# Patient Record
Sex: MALE | Race: ASIAN | NOT HISPANIC OR LATINO | URBAN - METROPOLITAN AREA
[De-identification: names, ages, dates, MRNs, and addresses within clinical notes are randomized per-mention and may not be internally consistent; named-entity substitution may affect disease eponyms.]

---

## 2018-02-02 NOTE — PROGRESS NOTES
SUBJECTIVE:   CC: Israel Urrutia is an 37 year old male who presents for preventative health visit. His last physical was in 2015.  He has the following concerns:    Has not been taking care of himself.  Two small kids and very busy life.  Lives in the [facility] with family.  Travels internationally frequently. Has gained weight over the past couple years.  Wt Readings from Last 4 Encounters:   02/08/18 196 lb 8 oz (89.1 kg)   05/19/16 196 lb (88.9 kg)   03/12/15 186 lb (84.4 kg)   11/07/14 184 lb (83.5 kg)     BP Readings from Last 6 Encounters:   02/08/18 (!) 141/92   05/19/16 121/83   03/12/15 (!) 128/91   11/07/14 124/82   03/14/14 125/84   10/14/13 120/81       Healthy Habits:    Do you get at least three servings of calcium containing foods daily (dairy, green leafy vegetables, etc.)?  no, but taking  vitamin D supplement    Amount of exercise or daily activities, outside of work: 1-2 days per week of running.    Problems taking medications regularly Yes most of the time forgets to take them.    Medication side effects: No    Have you had an eye exam in the past two years? yes    Do you see a dentist twice per year? yes    Do you have sleep apnea, excessive snoring or daytime drowsiness?no       Today's PHQ-2 Score:   PHQ-2 ( 1999 Pf[employer]) 2/8/2018 5/19/2016   Q1: Little interest or pleasure in doing things 0 0   Q2: Feeling down, depressed or hopeless 0 0   PHQ-2 Score 0 0     Patient Active Problem List    Diagnosis Date Noted     Lichen planus      Priority: Medium     arms/chest         Past Medical History:   Diagnosis Date     Lichen planus     arms/chest       Past Surgical History:   Procedure Laterality Date     NO HISTORY OF SURGERY         Family History   Problem Relation Age of Onset     Thyroid Disease Mother      DIABETES Mother      Hypertension Father        Social History   Substance Use Topics     Smoking status: Never Smoker     Smokeless tobacco: Never Used     Alcohol use 0.6  oz/week     1 Standard drinks or equivalent per week       Social History     Social History Narrative    , 1 son.  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  Travels frequently.         [place] lives wife and 2 young children.  Work is stressful and he travels internationally frequently.    Has a good support system.    Feels safe in all environments.    Wears seatbelt 100% of the time    Wears helmet while biking.    Wears glasses.  Regular yearly visits.    Denies history of abuse, past or present, physical, sexual or emotional.    02/08/18    Pily Cunningham PA-C           Current Outpatient Prescriptions   Medication Sig Dispense Refill     Cholecalciferol (VITAMIN D) 1000 UNITS capsule Take 1 capsule by mouth daily       fish oil-omega-3 fatty acids (FISH OIL) 1000 MG capsule Take 2 g by mouth daily         Reviewed orders with patient. Reviewed health maintenance and updated orders accordingly - Yes    Reviewed and updated as needed this visit by clinical staff  Tobacco  Allergies  Meds  Problems  Med Hx  Surg Hx  Fam Hx  Soc Hx          Reviewed and updated as needed this visit by Provider  Tobacco  Allergies  Meds  Problems  Med Hx  Surg Hx  Fam Hx  Soc Hx           ROS:  C: NEGATIVE for fever, chills, change in weight  I: NEGATIVE for worrisome rashes, moles or lesions  E: NEGATIVE for vision changes or irritation  ENT: NEGATIVE for ear, mouth and throat problems  R: NEGATIVE for significant cough or SOB  CV: NEGATIVE for chest pain, palpitations or peripheral edema  GI: NEGATIVE for nausea, abdominal pain, heartburn, or change in bowel habits   male: negative for dysuria, hematuria, decreased urinary stream, erectile dysfunction, urethral discharge  M: NEGATIVE for significant arthralgias or myalgia  N: NEGATIVE for weakness, dizziness or paresthesias  E: NEGATIVE for temperature intolerance, skin/hair changes  H: NEGATIVE for bleeding problems  P: NEGATIVE for changes in mood or  "affect    OBJECTIVE:   BP (!) 141/92 (BP Location: Right arm, Patient Position: Chair, Cuff Size: Adult Regular)  Pulse 70  Temp 98.5  F (36.9  C) (Temporal)  Ht 5' 8.5\" (174 cm)  Wt 196 lb 8 oz (89.1 kg)  SpO2 100%  BMI 29.44 kg/m2  EXAM:  GENERAL: healthy, alert and no distress  EYES: Eyes grossly normal to inspection, PERRL and conjunctivae and sclerae normal  HENT: ear canals and TM's normal, nose and mouth without ulcers or lesions  NECK: no adenopathy, no asymmetry, masses, or scars and thyroid normal to palpation. No bruits  RESP: lungs clear to auscultation - no rales, rhonchi or wheezes  CV: regular rate and rhythm, normal S1 S2, no S3 or S4, no murmur, click or rub, no peripheral edema and peripheral pulses strong  ABDOMEN: soft, nontender, no hepatosplenomegaly, no masses and bowel sounds normal  MS: no gross musculoskeletal defects noted, no clubbing, edema or cyanosis of extremities.  Pulses = and appropriate bilaterally to DP and PT  SKIN: no suspicious lesions or rashes  NEURO: Normal strength and tone, mentation intact and speech normal  PSYCH: mentation appears normal, affect normal/bright  LYMPH: no cervical, supraclavicular, axillary, or inguinal adenopathy    ASSESSMENT/PLAN:       ICD-10-CM    1. Routine general medical examination at a health care facility Z00.00 Glucose Casual ([place])   2. Elevated blood pressure reading without diagnosis of hypertension R03.0    3. Screening for lipid disorders Z13.220 Lipid Panel ([facility])       COUNSELING:  Reviewed preventive health counseling, as reflected in patient instructions  Special attention given to:        Regular exercise       Healthy diet/nutrition       Vision screening       Immunizations    Recommended flu vac/declined       Osteoporosis Prevention/Bone Health    BP Screening:   Last 3 BP Readings:    BP Readings from Last 3 Encounters:   02/08/18 (!) 141/92   05/19/16 121/83   03/12/15 (!) 128/91       The following was " "recommended to the patient:  Re-screen within 4 weeks and recommend lifestyle modifications:  Recommended DASH diet.   reports that he has never smoked. He has never used smokeless tobacco.    Estimated body mass index is 29.44 kg/(m^2) as calculated from the following:    Height as of this encounter: 5' 8.5\" (174 cm).    Weight as of this encounter: 196 lb 8 oz (89.1 kg).   Weight management plan: Discussed healthy diet and exercise guidelines and patient will follow up in 12 months in clinic to re-evaluate.    Counseling Resources:  ATP IV Guidelines  Pooled Cohorts Equation Calculator  FRAX Risk Assessment  ICSI Preventive Guidelines  Dietary Guidelines for Americans, 2010  USDA's MyPlate  ASA Prophylaxis  Lung CA Screening    Chayo Cunningham PA-C  [facility]  "

## 2018-02-08 ENCOUNTER — OFFICE VISIT (OUTPATIENT)
Dept: FAMILY MEDICINE | Facility: CLINIC | Age: 38
End: 2018-02-08
Payer: COMMERCIAL

## 2018-02-08 VITALS
OXYGEN SATURATION: 100 % | TEMPERATURE: 98.5 F | BODY MASS INDEX: 29.1 KG/M2 | HEART RATE: 70 BPM | SYSTOLIC BLOOD PRESSURE: 141 MMHG | HEIGHT: 69 IN | WEIGHT: 196.5 LBS | DIASTOLIC BLOOD PRESSURE: 92 MMHG

## 2018-02-08 DIAGNOSIS — Z13.220 SCREENING FOR LIPID DISORDERS: ICD-10-CM

## 2018-02-08 DIAGNOSIS — R03.0 ELEVATED BLOOD PRESSURE READING WITHOUT DIAGNOSIS OF HYPERTENSION: ICD-10-CM

## 2018-02-08 DIAGNOSIS — Z00.00 ROUTINE GENERAL MEDICAL EXAMINATION AT A HEALTH CARE FACILITY: Primary | ICD-10-CM

## 2018-02-08 LAB
CHOLEST SERPL-MCNC: 215 MG/DL (ref 0–200)
CHOLEST/HDLC SERPL: 4.7 {RATIO} (ref 0–5)
FASTING SPECIMEN: NO
GLUCOSE CASUAL: 84 MG/DL (ref 51–200)
HDLC SERPL-MCNC: 46 MG/DL
LDLC SERPL CALC-MCNC: 134 MG/DL (ref 0–129)
TRIGL SERPL-MCNC: 178 MG/DL (ref 0–150)
VLDL-CHOLESTEROL: 36 (ref 7–32)

## 2018-02-08 ASSESSMENT — PAIN SCALES - GENERAL: PAINLEVEL: NO PAIN (0)

## 2018-02-08 NOTE — MR AVS SNAPSHOT
After Visit Summary   2/8/2018    Isreal Urrutia    MRN: [id number]           Patient Information     Date Of Birth          1980        Visit Information        Provider Department      2/8/2018 10:00 AM Chayo Cunningham PA-C [facility]        Today's Diagnoses     Routine general medical examination at a health care facility    -  1    Screening for lipid disorders          Care Instructions      Preventive Health Recommendations  Male Ages 26 - 39    Yearly exam:             See your health care provider every year in order to  o   Review health changes.   o   Discuss preventive care.    o   Review your medicines if your doctor has prescribed any.    You should be tested each year for STDs (sexually transmitted diseases), if you re at risk.     After age 35, talk to your provider about cholesterol testing. If you are at risk for heart disease, have your cholesterol tested at least every 5 years.     If you are at risk for diabetes, you should have a diabetes test (fasting glucose).  Shots: Get a flu shot each year. Get a tetanus shot every 10 years.     Nutrition:    Eat at least 5 servings of fruits and vegetables daily.     Eat whole-grain bread, whole-wheat pasta and brown rice instead of white grains and rice.     Talk to your provider about Calcium and Vitamin D.     Lifestyle    Exercise for at least 150 minutes a week (30 minutes a day, 5 days a week). This will help you control your weight and prevent disease.     Limit alcohol to one drink per day.     No smoking.     Wear sunscreen to prevent skin cancer.     See your dentist every six months for an exam and cleaning.             Follow-ups after your visit        Who to contact     Please call your clinic at [phone] to:    Ask questions about your health    Make or cancel appointments    Discuss your medicines    Learn about your test results    Speak to your doctor   If you have compliments or concerns about an  "experience at your clinic, or if you wish to file a complaint, please contact [facility] Patient Relations at [phone] or email us at [email or web address]         Additional Information About Your Visit        [employer] Information     [employer] gives you secure access to your electronic health record. If you see a primary care provider, you can also send messages to your care team and make appointments. If you have questions, please call your primary care clinic.  If you do not have a primary care provider, please call [phone] and they will assist you.      [employer] is an electronic gateway that provides easy, online access to your medical records. With [employer], you can request a clinic appointment, read your test results, renew a prescription or communicate with your care team.     To access your existing account, please contact your [facility] or call [phone] for assistance.        Care EveryWhere ID     This is your Care EveryWhere ID. This could be used by other organizations to access your [facility] medical records  [id number]        Your Vitals Were     Pulse Temperature Height Pulse Oximetry BMI (Body Mass Index)       70 98.5  F (36.9  C) (Temporal) 5' 8.5\" (174 cm) 100% 29.44 kg/m2        Blood Pressure from Last 3 Encounters:   02/08/18 (!) 146/94   05/19/16 121/83   03/12/15 (!) 128/91    Weight from Last 3 Encounters:   02/08/18 196 lb 8 oz (89.1 kg)   05/19/16 196 lb (88.9 kg)   03/12/15 186 lb (84.4 kg)              We Performed the Following     Glucose Casual ([place])     Lipid Panel ([facility])        Primary Care Provider Office Phone # Fax #    Shabnam Santiago [phone]       [address] [place] [id number]        Equal Access to Services     CHAIM MENENDEZ : Jaye Cai, adis woodard, qajessica da silva. So wa " [phone].    ATENCIÓN: Si ikerla alec, tiene a zendejas disposición servicios gratuitos de asistencia lingüística. Renée al [phone].    We comply with applicable federal civil rights laws and [place] laws. We do not discriminate on the basis of race, color, national origin, age, disability, sex, sexual orientation, or gender identity.            Thank you!     Thank you for choosing [facility]  for your care. Our goal is always to provide you with excellent care. Hearing back from our patients is one way we can continue to improve our services. Please take a few minutes to complete the written survey that you may receive in the mail after your visit with us. Thank you!             Your Updated Medication List - Protect others around you: Learn how to safely use, store and throw away your medicines at www.disposemymeds.org.          This list is accurate as of 2/8/18 11:18 AM.  Always use your most recent med list.                   Brand Name Dispense Instructions for use Diagnosis    fish oil-omega-3 fatty acids 1000 MG capsule      Take 2 g by mouth daily    Visit for preventive health examination       vitamin D 1000 UNITS capsule      Take 1 capsule by mouth daily    Visit for preventive health examination

## 2019-04-26 NOTE — PROGRESS NOTES
Israel Urrutia is here for a general check up.  He is  fasting. He is due for an eye exams and dental visits. Wears seat belt.--yes Wears bike helmet--yes.  Concerns today:     ELICIA French is a healthy 39 yo male, here for a check up. He declines flu vaccine. Up to date on all others. .    Diet:  Keto diet for 45 days and was able to lose weight.. Eats meat, eggs, less carbs. Lowest weight 178 lb, now about 185 on home scale    Seasonal allergies.  These occur in spring, he is asking about whether Claritin or Zyrtec are addictive medications. No hx of asthma, but suffers from sinus pressure, congestion and watery eyes in the past month.    Lichen planus  He has a dry itchy patch of skin on the volar surface of his hand. Used to use a steroid cream, needs med refills. Uses cream to moisturize..     Health Maintenance   Topic Date Due     HIV SCREEN (SYSTEM ASSIGNED)  06/13/1996     INFLUENZA VACCINE (1) 09/01/2018     PHQ-2  01/01/2019     PREVENTIVE CARE VISIT  02/08/2019     DTAP/TDAP/TD IMMUNIZATION (2 - Td) 03/16/2022     LIPID SCREEN Q5 YR MALE (SYSTEM ASSIGNED)  02/08/2023     ZOSTER IMMUNIZATION (1 of 2) 06/13/2028     IPV IMMUNIZATION  Aged Out     MENINGITIS IMMUNIZATION  Aged Out         Patient Active Problem List   Diagnosis     Lichen planus       Past Surgical History:   Procedure Laterality Date     NO HISTORY OF SURGERY         Family History   Problem Relation Age of Onset     Thyroid Disease Mother      Diabetes Mother      Hypertension Father        Social  , 5 yo, 2yo children  Works in Sales     HABITS:  Tob: none  ETOH: 2 per week  Calcium: none, no vitamin D  Caffeine: 1/per day  Exercise: running    MALE ROS  Partner: monogamous with spouse  ED: none  Contraception: withdrawal method  STD concerns: no  BPH: no symptoms      Current Outpatient Medications   Medication Sig Dispense Refill     Cholecalciferol (VITAMIN D) 1000 UNITS capsule Take 1 capsule by mouth daily       fish  "oil-omega-3 fatty acids (FISH OIL) 1000 MG capsule Take 2 g by mouth daily       Allergies   Allergen Reactions     Shellfish Allergy          ROS  CONSTITUTIONAL:NEGATIVE for fever, chills, concerted weight loss  INTEGUMENTARY/SKIN: NEGATIVE for worrisome rashes, moles or lesions, hx of lichen planus on hand  EYES: NEGATIVE for vision changes or irritation  ENT/MOUTH: NEGATIVE for ear, mouth and throat problems  RESP:NEGATIVE for significant cough or SOB  CV: NEGATIVE for chest pain, palpitations, VILLEGAS, orthopnea, PND  or peripheral edema  GI: NEGATIVE for nausea, abdominal pain, heartburn, or change in bowel habits  :NEGATIVE for frequency, dysuria, or hematuria  MUSCULOSKELETAL:NEGATIVE for significant arthralgias or myalgia  NEURO: NEGATIVE for weakness, dizziness or paresthesias  ENDOCRINE: NEGATIVE for polyuria/dipsia,  temperature intolerance, skin/hair changes  HEME/ALLERGY/IMMUNE: NEGATIVE for bleeding problems  PSYCHIATRIC: NEGATIVE for changes in mood or affect    EXAM  /82 (BP Location: Right arm, Patient Position: Sitting, Cuff Size: Adult Large)   Pulse 63   Temp 97.4  F (36.3  C) (Oral)   Resp 16   Ht 1.695 m (5' 6.73\")   Wt 86.9 kg (191 lb 8 oz)   SpO2 100%   BMI 30.23 kg/m      GENERAL APPEARANCE: Alert, pleasant, NAD  EYES: PERRL, EOMI, conjunctiva clear  HENT: TM normal bilaterally. Nose and mouth without lesions  NECK: no adenopathy, thyroid normal to palpation  RESP: lungs clear to auscultation bilaterally  Axillae: no palpable axillary masses or adenopathy  CV: regular rate and rhythm, normal S1 S2, no murmur, no carotid bruits  ABDOMEN: soft, nontender, without HSM or masses. Bowel sounds normal  : no inguinal hernias or adenopathy, no testicular masses  Rectal exam: deferred  MS: extremities normal- no gross deformities noted, no tender, hot or swollen joints.    SKIN: no suspicious lesions or rashes, linear band of dry, scaly skin over volar surface of his hand  NEURO: Normal " strength and tone, sensory exam grossly normal, DTR normoreflexive in upper and lower extremities  PSYCH: mentation appears normal. and affect normal/bright.  EXT: no peripheral edema, pedal pulses palpable    Assessment:  (Z00.00) Routine general medical examination at a health care facility  (primary encounter diagnosis)  Comment: healthy male  Plan: Hemoglobin A1c ([place]), Vitamin D         Deficiency        Anticipatory guidance given today regarding diet, exercise and calcium intake, safety. NELDA taught. Gave health care directive       (Z13.220) Screening for lipid disorders  Comment: fasting  Plan: Lipid Panel ([place]), Basic Metabolic Panel         ([place])            (Z11.4) Screening for HIV (human immunodeficiency virus)  Comment: screening  Plan: HIV Antigen Antibody Combo            (L43.9) Lichen planus  Comment: flare on volar surface of hand  Plan: triamcinolone (KENALOG) 0.5 % external ointment        Recommend moisturizing daily, apply steroid once or twice daily.    Shabnam Santiago MD  Internal Medicine/Pediatrics

## 2019-05-03 ENCOUNTER — OFFICE VISIT (OUTPATIENT)
Dept: FAMILY MEDICINE | Facility: CLINIC | Age: 39
End: 2019-05-03
Payer: COMMERCIAL

## 2019-05-03 VITALS
OXYGEN SATURATION: 100 % | DIASTOLIC BLOOD PRESSURE: 82 MMHG | TEMPERATURE: 97.4 F | BODY MASS INDEX: 30.06 KG/M2 | RESPIRATION RATE: 16 BRPM | HEIGHT: 67 IN | WEIGHT: 191.5 LBS | HEART RATE: 63 BPM | SYSTOLIC BLOOD PRESSURE: 121 MMHG

## 2019-05-03 DIAGNOSIS — Z13.220 SCREENING FOR LIPID DISORDERS: ICD-10-CM

## 2019-05-03 DIAGNOSIS — L43.9 LICHEN PLANUS: ICD-10-CM

## 2019-05-03 DIAGNOSIS — Z00.00 ROUTINE GENERAL MEDICAL EXAMINATION AT A HEALTH CARE FACILITY: Primary | ICD-10-CM

## 2019-05-03 DIAGNOSIS — Z11.4 SCREENING FOR HIV (HUMAN IMMUNODEFICIENCY VIRUS): ICD-10-CM

## 2019-05-03 LAB
BUN SERPL-MCNC: 16 MG/DL (ref 5–24)
CALCIUM SERPL-MCNC: 9.5 MG/DL (ref 8.5–10.4)
CHLORIDE SERPLBLD-SCNC: 104 MMOL/L (ref 94–109)
CHOLEST SERPL-MCNC: 228 MG/DL (ref 0–200)
CHOLEST/HDLC SERPL: 5.3 {RATIO} (ref 0–5)
CO2 SERPL-SCNC: 31 MMOL/L (ref 20–32)
CREAT SERPL-MCNC: 1 MG/DL (ref 0.8–1.5)
DEPRECATED CALCIDIOL+CALCIFEROL SERPL-MC: 16 UG/L (ref 20–75)
EGFR CALCULATED (BLACK REFERENCE): 106.4
EGFR CALCULATED (NON BLACK REFERENCE): 88
FASTING SPECIMEN: YES
GLUCOSE SERPL-MCNC: 106 MG/DL (ref 60–109)
HBA1C MFR BLD: 5.1 % (ref 4.1–5.7)
HDLC SERPL-MCNC: 43 MG/DL
HIV 1+2 AB+HIV1 P24 AG SERPL QL IA: NONREACTIVE
LDLC SERPL CALC-MCNC: 137 MG/DL (ref 0–129)
POTASSIUM SERPL-SCNC: 4 MMOL/L (ref 3.4–5.3)
SODIUM SERPL-SCNC: 141 MMOL/L (ref 136–145)
TRIGL SERPL-MCNC: 238 MG/DL (ref 0–150)
VLDL-CHOLESTEROL: 48 (ref 7–32)

## 2019-05-03 RX ORDER — TRIAMCINOLONE ACETONIDE 5 MG/G
OINTMENT TOPICAL
Qty: 15 G | Refills: 1 | Status: SHIPPED | OUTPATIENT
Start: 2019-05-03

## 2019-05-03 ASSESSMENT — MIFFLIN-ST. JEOR: SCORE: 1733.01

## 2019-05-03 NOTE — PATIENT INSTRUCTIONS
Triamcinolone ointment  Thin layer to affected area at bedtime    Drop down to 1% hydrocortisone OINTMENT    Moisturize with cream    Lipoma on arm, benign    Allergies  Zyrtec or Claritin 10mg daily  Start daily as snow is melting then continue for about a month then stop

## 2019-05-03 NOTE — NURSING NOTE
"38 year old  Chief Complaint   Patient presents with     Physical       Blood pressure 121/82, pulse 63, temperature 97.4  F (36.3  C), temperature source Oral, resp. rate 16, height 1.695 m (5' 6.73\"), weight 86.9 kg (191 lb 8 oz), SpO2 100 %. Body mass index is 30.23 kg/m .  Patient Active Problem List   Diagnosis     Lichen planus       Wt Readings from Last 2 Encounters:   05/03/19 86.9 kg (191 lb 8 oz)   02/08/18 89.1 kg (196 lb 8 oz)     BP Readings from Last 3 Encounters:   05/03/19 121/82   02/08/18 (!) 141/92   05/19/16 121/83         Current Outpatient Medications   Medication     fish oil-omega-3 fatty acids (FISH OIL) 1000 MG capsule     Cholecalciferol (VITAMIN D) 1000 UNITS capsule     No current facility-administered medications for this visit.        Social History     Tobacco Use     Smoking status: Never Smoker     Smokeless tobacco: Never Used   Substance Use Topics     Alcohol use: Yes     Alcohol/week: 0.6 oz     Types: 1 Standard drinks or equivalent per week     Drug use: No       Health Maintenance Due   Topic Date Due     HIV SCREEN (SYSTEM ASSIGNED)  06/13/1996     PHQ-2  01/01/2019     PREVENTIVE CARE VISIT  02/08/2019       No results found for: PAP      May 3, 2019 8:13 AM    "

## 2019-05-14 ENCOUNTER — TRANSFERRED RECORDS (OUTPATIENT)
Dept: HEALTH INFORMATION MANAGEMENT | Facility: CLINIC | Age: 39
End: 2019-05-14

## 2020-03-02 ENCOUNTER — HEALTH MAINTENANCE LETTER (OUTPATIENT)
Age: 40
End: 2020-03-02

## 2020-11-17 ENCOUNTER — OFFICE VISIT (OUTPATIENT)
Dept: FAMILY MEDICINE | Facility: CLINIC | Age: 40
End: 2020-11-17
Payer: COMMERCIAL

## 2020-11-17 VITALS
SYSTOLIC BLOOD PRESSURE: 125 MMHG | HEART RATE: 68 BPM | WEIGHT: 155.5 LBS | DIASTOLIC BLOOD PRESSURE: 82 MMHG | RESPIRATION RATE: 16 BRPM | BODY MASS INDEX: 23.03 KG/M2 | OXYGEN SATURATION: 100 % | HEIGHT: 69 IN | TEMPERATURE: 96.8 F

## 2020-11-17 DIAGNOSIS — Z11.59 NEED FOR HEPATITIS C SCREENING TEST: ICD-10-CM

## 2020-11-17 DIAGNOSIS — E55.9 VITAMIN D DEFICIENCY: ICD-10-CM

## 2020-11-17 DIAGNOSIS — Z30.9 ENCOUNTER FOR CONTRACEPTIVE MANAGEMENT, UNSPECIFIED TYPE: ICD-10-CM

## 2020-11-17 DIAGNOSIS — Z00.00 ROUTINE GENERAL MEDICAL EXAMINATION AT A HEALTH CARE FACILITY: Primary | ICD-10-CM

## 2020-11-17 LAB
ALBUMIN SERPL-MCNC: 4.6 G/DL (ref 3.4–5)
ALP SERPL-CCNC: 74 U/L (ref 40–150)
ALT SERPL W P-5'-P-CCNC: 19 U/L (ref 0–70)
ANION GAP SERPL CALCULATED.3IONS-SCNC: 4 MMOL/L (ref 3–14)
AST SERPL W P-5'-P-CCNC: 16 U/L (ref 0–45)
BILIRUB SERPL-MCNC: 1.2 MG/DL (ref 0.2–1.3)
BUN SERPL-MCNC: 13 MG/DL (ref 7–30)
CALCIUM SERPL-MCNC: 9.6 MG/DL (ref 8.5–10.1)
CHLORIDE SERPL-SCNC: 105 MMOL/L (ref 94–109)
CHOLEST SERPL-MCNC: 220 MG/DL
CO2 SERPL-SCNC: 31 MMOL/L (ref 20–32)
CREAT SERPL-MCNC: 1.09 MG/DL (ref 0.66–1.25)
GFR SERPL CREATININE-BSD FRML MDRD: 83 ML/MIN/{1.73_M2}
GLUCOSE SERPL-MCNC: 94 MG/DL (ref 70–99)
HDLC SERPL-MCNC: 50 MG/DL
LDLC SERPL CALC-MCNC: 153 MG/DL
NONHDLC SERPL-MCNC: 171 MG/DL
POTASSIUM SERPL-SCNC: 4.1 MMOL/L (ref 3.4–5.3)
PROT SERPL-MCNC: 8.3 G/DL (ref 6.8–8.8)
SODIUM SERPL-SCNC: 139 MMOL/L (ref 133–144)
TRIGL SERPL-MCNC: 88 MG/DL

## 2020-11-17 ASSESSMENT — MIFFLIN-ST. JEOR: SCORE: 1587.78

## 2020-11-17 NOTE — PROGRESS NOTES
Israel Urrutia is here for a general check up.  He is fasting. He is  up to date on eye exams and dental visits. Wears seat belt.--yes Wears bike helmet--yes.  Concerns today:    ELICIA French is a healthy 41 yo male here for preventive exam.  He declines Flu vaccine. Tdap is up to date. He would like referral to have vasectomy.  He is due for routine Hep C screening today.    He has made a concerted effort to lose weight in the past year with intermittent fasting and limiting carbs. He has lost almost 40 pounds. He feels great.     Wt Readings from Last 2 Encounters:   11/17/20 70.5 kg (155 lb 8 oz)   05/03/19 86.9 kg (191 lb 8 oz)     Body mass index is 23.3 kg/m .      Health Maintenance   Topic Date Due     HEPATITIS C SCREENING  06/13/1996     PHQ-2  01/01/2020     PREVENTIVE CARE VISIT  05/03/2020     INFLUENZA VACCINE (1) 09/01/2020     DTAP/TDAP/TD IMMUNIZATION (2 - Td) 03/16/2022     LIPID  05/03/2024     HIV SCREENING  Completed     Pneumococcal Vaccine: Pediatrics (0 to 5 Years) and At-Risk Patients (6 to 64 Years)  Aged Out     IPV IMMUNIZATION  Aged Out     MENINGITIS IMMUNIZATION  Aged Out     HEPATITIS B IMMUNIZATION  Aged Out         Patient Active Problem List   Diagnosis     Lichen planus       Past Surgical History:   Procedure Laterality Date     NO HISTORY OF SURGERY         Family History   Problem Relation Age of Onset     Thyroid Disease Mother      Diabetes Mother      Hypertension Father        Social  , 9 yo, 6yo children  Works in Sales , working remotely     HABITS:  Tob: none  ETOH: none  Calcium: 2 per day, no vitamin D 2000 IU  Caffeine: 1/per day  Exercise: some golf, swim, limited with COVID     MALE ROS  Partner: monogamous with spouse  ED: none  Contraception: withdrawal method/ asks for referral for vasectomy  STD concerns: no  BPH: no symptoms       Current Outpatient Medications   Medication Sig Dispense Refill     triamcinolone (KENALOG) 0.5 % external ointment Apply once  "or twice daily to affected area 15 g 1     Allergies   Allergen Reactions     Shellfish Allergy          ROS  CONSTITUTIONAL:NEGATIVE for fever, chills, Positive concerted weight loss  INTEGUMENTARY/SKIN: NEGATIVE for worrisome rashes, moles or lesions. + Lichen Planus on volar surface of right hand, uses steroid cream and moisturizing cream prn  EYES: NEGATIVE for vision changes or irritation, glasses  ENT/MOUTH: NEGATIVE for ear, mouth and throat problems  RESP:NEGATIVE for significant cough or SOB  CV: NEGATIVE for chest pain, palpitations, VILLEGAS, orthopnea, PND  or peripheral edema  GI: NEGATIVE for nausea, abdominal pain, heartburn, or change in bowel habits  :NEGATIVE for frequency, dysuria, or hematuria  MUSCULOSKELETAL:NEGATIVE for significant arthralgias or myalgia  NEURO: NEGATIVE for weakness, dizziness or paresthesias  ENDOCRINE: NEGATIVE for polyuria/dipsia,  temperature intolerance, skin/hair changes  HEME/ALLERGY/IMMUNE: NEGATIVE for bleeding problems  PSYCHIATRIC: NEGATIVE for changes in mood or affect    EXAM  /82 (BP Location: Right arm, Patient Position: Sitting, Cuff Size: Adult Regular)   Pulse 68   Temp 96.8  F (36  C) (Temporal)   Resp 16   Ht 1.74 m (5' 8.5\")   Wt 70.5 kg (155 lb 8 oz)   SpO2 100%   BMI 23.30 kg/m    GENERAL APPEARANCE: Alert, pleasant, NAD  EYES: PERRL, EOMI, conjunctiva clear  HENT: TM normal bilaterally. Nose and mouth without lesions  NECK: no adenopathy, thyroid normal to palpation  RESP: lungs clear to auscultation bilaterally  Axillae: no palpable axillary masses or adenopathy  CV: regular rate and rhythm, normal S1 S2, no murmur, no carotid bruits  Chest: accessory nipple right side  ABDOMEN: soft, nontender, without HSM or masses. Bowel sounds normal  MS: extremities normal- no gross deformities noted, no tender, hot or swollen joints.    SKIN: no suspicious lesions or rashes, dry linear scaly area on volar surface right hand  NEURO: Normal strength and " tone, sensory exam grossly normal, DTR normoreflexive in upper and lower extremities  PSYCH: mentation appears normal. and affect normal/bright.  EXT: no peripheral edema, pedal pulses palpable    Assessment:  (Z00.00) Routine general medical examination at a health care facility  (primary encounter diagnosis)  Comment: healthy 40 year old male  Plan: Comprehensive metabolic panel, Lipid Profile        Today we discussed ways to maintain a healthy lifestyle with sensible eating, regular exercise and self cares. We dicussed calcium and Vitamin D intake, vaccinations and preventive health screens.        (E55.9) Vitamin D deficiency  Comment: history of low D, taking 2000 international unit(s) daily  Plan: Vitamin D Deficiency        Recheck level today    (Z11.59) Need for hepatitis C screening test  Comment: due for routine Hep C screen  Plan: Hepatitis C Screen Reflex to HCV RNA Quant and         Genotype            (Z30.9) Encounter for contraceptive management, unspecified type  Comment: requesting referral for vasectomy  Plan: UROLOGY ADULT REFERRAL        Referral is given.     Return in one year.    Shabnam Satniago MD  Internal Medicine/Pediatrics

## 2020-11-17 NOTE — NURSING NOTE
"40 year old  Chief Complaint   Patient presents with     Physical     40 yrs old  fasting        Blood pressure 125/82, pulse 68, temperature 96.8  F (36  C), temperature source Temporal, resp. rate 16, height 1.74 m (5' 8.5\"), weight 70.5 kg (155 lb 8 oz), SpO2 100 %. Body mass index is 23.3 kg/m .  Patient Active Problem List   Diagnosis     Lichen planus       Wt Readings from Last 2 Encounters:   11/17/20 70.5 kg (155 lb 8 oz)   05/03/19 86.9 kg (191 lb 8 oz)     BP Readings from Last 3 Encounters:   11/17/20 125/82   05/03/19 121/82   02/08/18 (!) 141/92         Current Outpatient Medications   Medication     triamcinolone (KENALOG) 0.5 % external ointment     No current facility-administered medications for this visit.        Social History     Tobacco Use     Smoking status: Never Smoker     Smokeless tobacco: Never Used   Substance Use Topics     Alcohol use: Yes     Alcohol/week: 1.0 standard drinks     Types: 1 Standard drinks or equivalent per week     Comment: 1-2  per week     Drug use: No       Health Maintenance Due   Topic Date Due     HEPATITIS C SCREENING  06/13/1996     PHQ-2  01/01/2020     PREVENTIVE CARE VISIT  05/03/2020     INFLUENZA VACCINE (1) 09/01/2020       No results found for: PAP      November 17, 2020 8:53 AM  "

## 2020-11-18 LAB
DEPRECATED CALCIDIOL+CALCIFEROL SERPL-MC: 20 UG/L (ref 20–75)
HCV AB SERPL QL IA: NONREACTIVE

## 2020-11-19 NOTE — TELEPHONE ENCOUNTER
MEDICAL RECORDS REQUEST   [facility]  Urology Clinic  [address] [place] [address]  PHONE: [phone]  Fax: [phone]        FUTURE VISIT INFORMATION                                                   Israel Urrutia, : 1980 scheduled for future visit at [facility]    APPOINTMENT INFORMATION:    Date: 2021 11AM    Provider:  Francisco Vogel MD    Reason for Visit/Diagnosis: Vasectomy     REFERRAL INFORMATION:    Referring provider:  Natalie    Specialty: MD    Referring providers clinic:      Clinic contact number:  N/A    RECORDS REQUESTED FOR VISIT                                                     NOTES  STATUS/DETAILS   OFFICE NOTE from referring provider  yes   OFFICE NOTE from other specialist  no   DISCHARGE SUMMARY from hospital  no   DISCHARGE REPORT from the ER  no   OPERATIVE REPORT  no   MEDICATION LIST  no     PRE-VISIT CHECKLIST      Record collection complete Yes   Appointment appropriately scheduled           (right time/right provider) Yes   MyChart activation Yes   Questionnaire complete If no, please explain: In process      Completed by: Kate Cortez

## 2020-12-20 ENCOUNTER — HEALTH MAINTENANCE LETTER (OUTPATIENT)
Age: 40
End: 2020-12-20

## 2021-01-12 ENCOUNTER — PRE VISIT (OUTPATIENT)
Dept: UROLOGY | Facility: CLINIC | Age: 41
End: 2021-01-12

## 2021-01-12 NOTE — TELEPHONE ENCOUNTER
Reason for Visit: Consult for Vasectomy    Contact Patient: n/a    Rooming Requirements: normal      Rebecca Ramirez LPN  01/12/21  2:22 PM

## 2021-01-21 ENCOUNTER — VIRTUAL VISIT (OUTPATIENT)
Dept: UROLOGY | Facility: CLINIC | Age: 41
End: 2021-01-21
Attending: INTERNAL MEDICINE
Payer: COMMERCIAL

## 2021-01-21 ENCOUNTER — PRE VISIT (OUTPATIENT)
Dept: UROLOGY | Facility: CLINIC | Age: 41
End: 2021-01-21

## 2021-01-21 DIAGNOSIS — Z30.9 ENCOUNTER FOR CONTRACEPTIVE MANAGEMENT, UNSPECIFIED TYPE: ICD-10-CM

## 2021-01-21 PROCEDURE — 99202 OFFICE O/P NEW SF 15 MIN: CPT | Mod: 95 | Performed by: UROLOGY

## 2021-01-21 RX ORDER — CHOLECALCIFEROL (VITAMIN D3) 50 MCG
1 TABLET ORAL DAILY
COMMUNITY

## 2021-01-21 RX ORDER — BIOTIN 10000 MCG
CAPSULE ORAL
COMMUNITY

## 2021-01-21 RX ORDER — CHLORAL HYDRATE 500 MG
2 CAPSULE ORAL DAILY
COMMUNITY

## 2021-01-21 NOTE — PATIENT INSTRUCTIONS
Please schedule [place] [facility] [place].     It was a pleasure meeting with you today.  Thank you for allowing me and my team the privilege of caring for you today.  YOU are the reason we are here, and I truly hope we provided you with the excellent service you deserve.  Please let us know if there is anything else we can do for you so that we can be sure you are leaving completely satisfied with your care experience.

## 2021-01-21 NOTE — LETTER
1/21/2021       RE: Israel Urrutia  [address]  [facility] [address]     Dear Colleague,    Thank you for referring your patient, Israel Urrutia, to the [facility] [place] at [facility]. Please see a copy of my visit note below.    Manolo is a 40 year old who is being evaluated via a billable video visit.      Video Visit Technology for this patient: Daniel Video Visit- Please send an invite to patient's [email or web address]    How would you like to obtain your AVS? MyChart  If the video visit is dropped, the invitation should be resent by: Email [email or web address]  Will anyone else be joining your video visit? No      Video Start Time: 1103 AM  Video-Visit Details    Type of service:  Video Visit    Video End Time:11:20 AM    Originating Location (pt. Location): Home    Distant Location (provider location):  [facility] [place]     Platform used for Video Visit: Daniel      CC: Desires sterilization, consult for vasectomy from Dr. Shabnam Santiago     HPI: Israel Urrutia is a 40 year old male with 2 children, and he is intersted in getting a vasectomy for sterilization.      No voiding problems.  No history of  trauma.  No hematuria  Normal sexual function.  No history of bleeding problems.    PMH:   Past Medical History:   Diagnosis Date     Lichen planus     arms/chest   No chronic medical problems   No history of surgery.     FAMILY HX:   Family History   Problem Relation Age of Onset     Thyroid Disease Mother      Diabetes Mother      Hypertension Father       No history of coagulopathies, no  malignancies.     ALLERGIES:      Allergies   Allergen Reactions     Shellfish Allergy        MEDS:   Current Outpatient Medications   Medication Sig     Biotin 10 MG CAPS      fish oil-omega-3 fatty acids 1000 MG capsule Take 2 g by mouth daily     vitamin D3 (CHOLECALCIFEROL) 50 mcg (2000  units) tablet Take 1 tablet by mouth daily     triamcinolone (KENALOG) 0.5 % external ointment Apply once or twice daily to affected area (Patient not taking: Reported on 11/17/2020)     No current facility-administered medications for this visit.        SOCIAL HISTORY:  Social History     Tobacco Use     Smoking status: Never Smoker     Smokeless tobacco: Never Used   Substance Use Topics     Alcohol use: Yes     Alcohol/week: 1.0 standard drinks     Types: 1 Standard drinks or equivalent per week     Comment: 1-2  per week     Drug use: No        REVIEW OF SYMPTOMS:   Denies testicular pain, ED, ejaculatory problems, rashes in the groin, easy bruising or bleeding.   No constitutional, eye, ENT, heart, lung, GI, musculoskeletal, skin, neurologic, psychiatric, endocrine or hematologic complaints.       GENERAL PHYSICAL EXAM:   Exam  General- Alert, oriented, nad.  Pleasant and conversant.  Eyes- anicteric, EOMI.  Resps- normal, non-labored.  No cough  Abdomen-  nondistended.   exam- deferred.   Neurological - no tremors  Skin - no discoloration/ lesions noted  Psychiatric - no anxiety, alert & oriented.       The rest of a comprehensive physical examination is deferred due to PHE (public health emergency) video visit restrictions.      I discussed with him at length the risks and benefits of the procedure. He understands that this is a sterilization procedure, and not reversible contraception. He understands that reversals, while possible, are not guaranteed to work and fairly complex. I discussed with him the option of sperm cryopreservation.     I stressed that he continues to be fertile in the post-operative period, and that he should continue using other contraceptive methods, such as a condom, until he obtains a semen analysis and we review the results to confirm success of the procedure and infertility. I also stressed to him that recanalization and pregnancy can occur in about 1 per thousand cases, possibly  more even after we clear him with a semen analysis showing no motile sperm. I counseled him on the kadi-operative risks of bleeding, infection, pain.  I described to him post-vasectomy pain syndrome that can occur in about 1 to 2% of men undergoing vasectomy.     We also discussed recovery times (typically days if no complications) and post-operative care including use of ice packs, pain medication and wound care.    He would like to schedule a vasectomy procedure.      Sincerely,    Francisco Vogel MD

## 2021-01-21 NOTE — PROGRESS NOTES
Manolo is a 40 year old who is being evaluated via a billable video visit.      Video Visit Technology for this patient: Daniel Video Visit- Please send an invite to patient's [email or web address]    How would you like to obtain your AVS? MyChart  If the video visit is dropped, the invitation should be resent by: Email [email or web address]  Will anyone else be joining your video visit? No      Video Start Time: 1103 AM  Video-Visit Details    Type of service:  Video Visit    Video End Time:11:20 AM    Originating Location (pt. Location): Home    Distant Location (provider location):  [facility] [place]     Platform used for Video Visit: Daniel      CC: Desires sterilization, consult for vasectomy from Dr. Shabnam Santiago     HPI: Israel Urrutia is a 40 year old male with 2 children, and he is intersted in getting a vasectomy for sterilization.      No voiding problems.  No history of  trauma.  No hematuria  Normal sexual function.  No history of bleeding problems.    PMH:   Past Medical History:   Diagnosis Date     Lichen planus     arms/chest   No chronic medical problems   No history of surgery.     FAMILY HX:   Family History   Problem Relation Age of Onset     Thyroid Disease Mother      Diabetes Mother      Hypertension Father       No history of coagulopathies, no  malignancies.     ALLERGIES:      Allergies   Allergen Reactions     Shellfish Allergy        MEDS:   Current Outpatient Medications   Medication Sig     Biotin 10 MG CAPS      fish oil-omega-3 fatty acids 1000 MG capsule Take 2 g by mouth daily     vitamin D3 (CHOLECALCIFEROL) 50 mcg (2000 units) tablet Take 1 tablet by mouth daily     triamcinolone (KENALOG) 0.5 % external ointment Apply once or twice daily to affected area (Patient not taking: Reported on 11/17/2020)     No current facility-administered medications for this visit.        SOCIAL HISTORY:  Social History     Tobacco Use     Smoking  status: Never Smoker     Smokeless tobacco: Never Used   Substance Use Topics     Alcohol use: Yes     Alcohol/week: 1.0 standard drinks     Types: 1 Standard drinks or equivalent per week     Comment: 1-2  per week     Drug use: No        REVIEW OF SYMPTOMS:   Denies testicular pain, ED, ejaculatory problems, rashes in the groin, easy bruising or bleeding.   No constitutional, eye, ENT, heart, lung, GI, musculoskeletal, skin, neurologic, psychiatric, endocrine or hematologic complaints.       GENERAL PHYSICAL EXAM:   Exam  General- Alert, oriented, nad.  Pleasant and conversant.  Eyes- anicteric, EOMI.  Resps- normal, non-labored.  No cough  Abdomen-  nondistended.   exam- deferred.   Neurological - no tremors  Skin - no discoloration/ lesions noted  Psychiatric - no anxiety, alert & oriented.       The rest of a comprehensive physical examination is deferred due to PHE (public health emergency) video visit restrictions.      I discussed with him at length the risks and benefits of the procedure. He understands that this is a sterilization procedure, and not reversible contraception. He understands that reversals, while possible, are not guaranteed to work and fairly complex. I discussed with him the option of sperm cryopreservation.     I stressed that he continues to be fertile in the post-operative period, and that he should continue using other contraceptive methods, such as a condom, until he obtains a semen analysis and we review the results to confirm success of the procedure and infertility. I also stressed to him that recanalization and pregnancy can occur in about 1 per thousand cases, possibly more even after we clear him with a semen analysis showing no motile sperm. I counseled him on the kadi-operative risks of bleeding, infection, pain.  I described to him post-vasectomy pain syndrome that can occur in about 1 to 2% of men undergoing vasectomy.     We also discussed recovery times (typically days  if no complications) and post-operative care including use of ice packs, pain medication and wound care.    He would like to schedule a vasectomy procedure.

## 2021-03-26 ENCOUNTER — OFFICE VISIT (OUTPATIENT)
Dept: UROLOGY | Facility: CLINIC | Age: 41
End: 2021-03-26
Payer: COMMERCIAL

## 2021-03-26 VITALS
HEART RATE: 72 BPM | WEIGHT: 160 LBS | HEIGHT: 69 IN | DIASTOLIC BLOOD PRESSURE: 76 MMHG | BODY MASS INDEX: 23.7 KG/M2 | SYSTOLIC BLOOD PRESSURE: 115 MMHG

## 2021-03-26 DIAGNOSIS — Z30.2 ENCOUNTER FOR VASECTOMY: Primary | ICD-10-CM

## 2021-03-26 PROCEDURE — 55250 REMOVAL OF SPERM DUCT(S): CPT | Performed by: UROLOGY

## 2021-03-26 RX ORDER — LIDOCAINE HYDROCHLORIDE 20 MG/ML
20 INJECTION, SOLUTION EPIDURAL; INFILTRATION; INTRACAUDAL; PERINEURAL ONCE
Status: DISPENSED | OUTPATIENT
Start: 2021-03-26

## 2021-03-26 ASSESSMENT — MIFFLIN-ST. JEOR: SCORE: 1616.14

## 2021-03-26 ASSESSMENT — PAIN SCALES - GENERAL
PAINLEVEL: NO PAIN (0)
PAINLEVEL: NO PAIN (0)

## 2021-03-26 NOTE — PATIENT INSTRUCTIONS
What Can I Expect After My Vasectomy?      Your scrotum may be swollen and bruised. We suggest you:  - Raise the area and apply ice packs (or frozen vegetables). Use the ice packs for 20 minutes on and 20 minutes off for 24 to 36 hours.  - Wear a jock strap or tight briefs for support for a week.  - Limit your activity for the first 24 to 36 hours. Wait up to 48 hours, if you feel the need. No heavy lifting for 1 week.      You should be able to return to work the next day, unless you do heavy physical work.      You can safely ejaculate (have a sexual climax) in about one week, or when it feels comfortable.    Risk of pregnancy    After your vasectomy, you can still get your partner pregnant for three months or more. Use extra birth control, such as condoms, until tests show that you are sterile (no live sperm).    Vasectomy is not 100 percent reliable. Pregnancy may occur for about 1 out of 2000 men even after testing shows zero sperm count.    Can a vasectomy be reversed?    Vasectomy is meant to be birth control that lasts a lifetime. If you change your mind, we can try to reverse it with surgery. But this will not be successful in every case.    Sperm count test    You will have a sperm-count test after the vasectomy. You should have had at least 20 ejaculations (discharges of semen) since your surgery. It will be a few months before you are sterile. Ten to twelve weeks after your surgery, schedule a sperm count test. Call [phone] to schedule. We will call you in 2 weeks with the results.    If you have any questions, please contact us:    [facility] (nurse line): [phone]

## 2021-03-26 NOTE — NURSING NOTE
The following medication was given:     MEDICATION:  Lidocaine   ROUTE: MD dose   SITE: MD dosr  DOSE: 20ml  LOT #: [id number]  : [employer]  EXPIRATION DATE: 06/24  NDC#: [id number]   Was there drug waste? Yes  Amount of drug waste (mL): 10.  Reason for waste:  As per MD Piedad Carlson, [facility]  March 26, 2021

## 2021-03-26 NOTE — LETTER
3/26/2021       RE: Israel Urrutia  [address]  [facility] [address]     Dear Colleague,    Thank you for referring your patient, Israel Urrutia, to the [facility] [place] at [facility]. Please see a copy of my visit note below.    Procedure: Vasectomy bilateral.   Anesthesia: Local lidocaine injection by surgeon.  Surgeon: CATALINA Vogel M.D.   Assistant:  none    Description of procedure: After the patient received education material regarding vasectomy, including risks and benefits, we proceeded with obtaining consent and proceeded with the surgery. He understands that there is a risk of late failure from recanalization. He understands that he is fertile until he has completed one or more semen analyses and he is given clearance from us for unprotected intercourse.  He understands that we will contact regarding the results but it is his responsibility to make sure he is cleared before having unprotected intercourse.  I have discussed with him other risks including bleeding, infection, acute and possible long-term pain.    The right vas was ligated first.  This was done using the standard technique by grasping it with a three-finger  and lifting it up to the skin.  A small amount of lidocaine was infiltrated into the skin and vasal sheath.  The skin was punctured and dilated bluntly using the scalpel-less dissector.  The sheath was identified and a rigid clamp was passed around the vas which was then lifted out of the incision.  The vas was cleaned off from the deferential vessels and the sheath, and cautery was used to divide the vas between mosquitos.  A small segment was removed and discarded.  The lumen of the vas was cannulated to confirm its identity, and the lumens of both ends were cauterized.  Pen cautery was used sparingly/as needed for minor bleeders.  A facial interposition was then performed with a single suture  of 4-0 chromic. The skin defect was closed with a 4-0 chromic interrupted suture after confirming adequate hemostasis.       We then turned our attention to the left side and a similar technique was performed. This involved division, excision of a segment, cautery of the lumens, and fascial interposition.    There were no hematomas noted at the end of the procedure.  The patient tolerated the procedure well.    He was advised to return for a post vasectomy semen check in 3 months, and that he is not sterile and must continue to use contraception until we tell him otherwise (based on follow-up semen specimen(s)).    Plan:  -Post vasectomy semen analysis in 3 months   -ice packs to scrotum X 24h  -keep incision dry until tomorrow  - over-the-counter analgesics recommended.    Fran CHAVARRIA

## 2021-03-26 NOTE — PROGRESS NOTES
Procedure: Vasectomy bilateral.   Anesthesia: Local lidocaine injection by surgeon.  Surgeon: CATALINA Vogel M.D.   Assistant:  none    Description of procedure: After the patient received education material regarding vasectomy, including risks and benefits, we proceeded with obtaining consent and proceeded with the surgery. He understands that there is a risk of late failure from recanalization. He understands that he is fertile until he has completed one or more semen analyses and he is given clearance from us for unprotected intercourse.  He understands that we will contact regarding the results but it is his responsibility to make sure he is cleared before having unprotected intercourse.  I have discussed with him other risks including bleeding, infection, acute and possible long-term pain.    The right vas was ligated first.  This was done using the standard technique by grasping it with a three-finger  and lifting it up to the skin.  A small amount of lidocaine was infiltrated into the skin and vasal sheath.  The skin was punctured and dilated bluntly using the scalpel-less dissector.  The sheath was identified and a rigid clamp was passed around the vas which was then lifted out of the incision.  The vas was cleaned off from the deferential vessels and the sheath, and cautery was used to divide the vas between mosquitos.  A small segment was removed and discarded.  The lumen of the vas was cannulated to confirm its identity, and the lumens of both ends were cauterized.  Pen cautery was used sparingly/as needed for minor bleeders.  A facial interposition was then performed with a single suture of 4-0 chromic. The skin defect was closed with a 4-0 chromic interrupted suture after confirming adequate hemostasis.       We then turned our attention to the left side and a similar technique was performed. This involved division, excision of a segment, cautery of the lumens, and fascial interposition.    There were no  hematomas noted at the end of the procedure.  The patient tolerated the procedure well.    He was advised to return for a post vasectomy semen check in 3 months, and that he is not sterile and must continue to use contraception until we tell him otherwise (based on follow-up semen specimen(s)).    Plan:  -Post vasectomy semen analysis in 3 months   -ice packs to scrotum X 24h  -keep incision dry until tomorrow  - over-the-counter analgesics recommended.    Fran CHAVARRIA

## 2021-10-03 ENCOUNTER — HEALTH MAINTENANCE LETTER (OUTPATIENT)
Age: 41
End: 2021-10-03

## 2022-01-23 ENCOUNTER — HEALTH MAINTENANCE LETTER (OUTPATIENT)
Age: 42
End: 2022-01-23

## 2022-03-22 NOTE — PROGRESS NOTES
"Israel \"Manolo\" Renan is a 41 year old male with hx of lichen planus on the palms of his hands. He is here for a general check up. He is fasting. He is up to date on eye exams and dental visits. Wears seat belt--yes. Wears bike helmet--yes.    HCM  Advanced directive: none on file, information given  COVID Series: Moderna 3/3  Vaccines: due for Tdap booster - elects to get Tdap booster today, influenza vaccine not available in clinic  Colonoscopy and PSA not yet indicated based on age.     Diet: eats \"almost everything\" except for red meat and pork, limits carbs. Intermittent fasting, first meal around 1 PM.    Manolo lost 40 pounds between 2019 and 2020 with intermittent fasting and monitoring carbs. Since then, he has gained about 22 pounds. Attributes weight gain from recent travel to [place] and \"falling off the wagon\" slightly, but now back in the gym and monitoring diet. Goal weight of 165 pounds, felt too lean at 155 pounds.   Wt Readings from Last 4 Encounters:   03/23/22 80.3 kg (177 lb 1.3 oz)   03/26/21 72.6 kg (160 lb)   11/17/20 70.5 kg (155 lb 8 oz)   05/03/19 86.9 kg (191 lb 8 oz)     Body mass index is 26.14 kg/m .      Health update - Vasectomy  Manolo had a vasectomy on 3/26/21, procedure performed by Dr. Vogel. Reports that his recovery went well. He has not gone in for a follow-up visit.     Health Maintenance   Topic Date Due     ADVANCE CARE PLANNING  Never done     INFLUENZA VACCINE (1) 09/01/2021     DTAP/TDAP/TD IMMUNIZATION (2 - Td or Tdap) 03/16/2022     PREVENTIVE CARE VISIT  03/23/2023     LIPID  11/17/2025     HEPATITIS C SCREENING  Completed     HIV SCREENING  Completed     PHQ-2 (once per calendar year)  Completed     COVID-19 Vaccine  Completed     Pneumococcal Vaccine: Pediatrics (0 to 5 Years) and At-Risk Patients (6 to 64 Years)  Aged Out     IPV IMMUNIZATION  Aged Out     MENINGITIS IMMUNIZATION  Aged Out     HEPATITIS B IMMUNIZATION  Aged Out         Patient Active Problem List "   Diagnosis     Lichen planus       Past Surgical History:   Procedure Laterality Date     NO HISTORY OF SURGERY       VASECTOMY  03/2021       Family History   Problem Relation Age of Onset     Thyroid Disease Mother      Diabetes Mother      Hypertension Father        Social    2 sons, 8 yo and 5 yo  Works in Sales, 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     HABITS:  Tob: none  ETOH: none  Calcium: 2/day + vitamin D 2000 IU daily   Caffeine: 1 cup of coffee/day  Exercise: some golf, swim, skiing     MALE ROS  Partner: monogamous with spouse  ED: none  Contraception: vasectomy in 2021, has not done follow-up  STD concerns: no  BPH: no symptoms      Current Outpatient Medications   Medication Sig Dispense Refill     Biotin 10 MG CAPS        fish oil-omega-3 fatty acids 1000 MG capsule Take 2 g by mouth daily       triamcinolone (KENALOG) 0.5 % external ointment Apply once or twice daily to affected area 15 g 1     vitamin D3 (CHOLECALCIFEROL) 50 mcg (2000 units) tablet Take 1 tablet by mouth daily       Allergies   Allergen Reactions     Shellfish Allergy          ROS  CONSTITUTIONAL:NEGATIVE for fever, chills, +17 pounds since March 2021  INTEGUMENTARY/SKIN: NEGATIVE for worrisome rashes, moles or lesions, dry skin on hands  EYES: NEGATIVE for vision changes or irritation  ENT/MOUTH: NEGATIVE for ear, mouth and throat problems  RESP:NEGATIVE for significant cough or SOB  CV: NEGATIVE for chest pain, palpitations, VILLEGAS, orthopnea, PND  or peripheral edema  GI: NEGATIVE for nausea, abdominal pain, heartburn, or change in bowel habits  :NEGATIVE for frequency, dysuria, or hematuria  MUSCULOSKELETAL:NEGATIVE for significant arthralgias or myalgia  NEURO: NEGATIVE for weakness, dizziness or paresthesias  ENDOCRINE: NEGATIVE for polyuria/dipsia,  temperature intolerance, skin/hair changes  HEME/ALLERGY/IMMUNE: NEGATIVE for bleeding problems  PSYCHIATRIC: NEGATIVE for changes in mood or  "affect    EXAM  /74   Pulse 53   Temp 97.8  F (36.6  C)   Ht 1.753 m (5' 9.02\")   Wt 80.3 kg (177 lb 1.3 oz)   SpO2 98%   BMI 26.14 kg/m      GENERAL APPEARANCE: Alert, pleasant, NAD  EYES: PERRL, EOMI, conjunctiva clear  HENT: TM normal bilaterally. Nose and mouth masked  NECK: no adenopathy, thyroid normal to palpation  RESP: lungs clear to auscultation bilaterally  Axillae: no palpable axillary masses or adenopathy  CV: regular rate and rhythm, normal S1 S2, no murmur, no carotid bruits  ABDOMEN: soft, nontender, without HSM or masses. Bowel sounds normal  MS: extremities normal- no gross deformities noted, no tender, hot or swollen joints.    SKIN: dry pale skin on palms, linear fissure, dry, generalized dry skin on lower extremities, flaking at heels  NEURO: Normal strength and tone, sensory exam grossly normal, DTR normoreflexive in upper and lower extremities  PSYCH: mentation appears normal. and affect normal/bright.  EXT: no peripheral edema, pedal pulses palpable    Assessment:  (Z00.00) Routine general medical examination at a health care facility  (primary encounter diagnosis)  Comment: 41 year old male in good health  Plan: Comprehensive metabolic panel, CBC with         platelets        Today we discussed ways to maintain a healthy lifestyle with sensible eating, regular exercise and self cares. We dicussed calcium and Vitamin D intake, vaccinations and preventive health screens.  Healthcare directive information given today.     (Z13.220) Screening for lipid disorders  Comment: fasting  Plan: Lipid Panel    (Z23) Need for Tdap vaccination  Comment: due for routine booster  Plan: TDAP VACCINE (Adacel, Boostrix)  [[id number]]        Given today.      Shabnam Santiago MD  Internal Medicine/Pediatrics      I, Silvia Blanco, am serving as a scribe to document services personally performed by Dr. Shabnam Santiago, based on data collection and the provider's statements to me. Dr. Santiago has reviewed, " edited, and approved the above note.

## 2022-03-23 ENCOUNTER — OFFICE VISIT (OUTPATIENT)
Dept: FAMILY MEDICINE | Facility: CLINIC | Age: 42
End: 2022-03-23
Payer: COMMERCIAL

## 2022-03-23 VITALS
OXYGEN SATURATION: 98 % | HEART RATE: 53 BPM | HEIGHT: 69 IN | TEMPERATURE: 97.8 F | SYSTOLIC BLOOD PRESSURE: 119 MMHG | WEIGHT: 177.08 LBS | BODY MASS INDEX: 26.23 KG/M2 | DIASTOLIC BLOOD PRESSURE: 74 MMHG

## 2022-03-23 DIAGNOSIS — Z13.220 SCREENING FOR LIPID DISORDERS: ICD-10-CM

## 2022-03-23 DIAGNOSIS — Z00.00 ROUTINE GENERAL MEDICAL EXAMINATION AT A HEALTH CARE FACILITY: Primary | ICD-10-CM

## 2022-03-23 DIAGNOSIS — Z23 NEED FOR TDAP VACCINATION: ICD-10-CM

## 2022-03-23 LAB
ALBUMIN SERPL-MCNC: 4.6 G/DL (ref 3.4–5)
ALP SERPL-CCNC: 59 U/L (ref 40–150)
ALT SERPL W P-5'-P-CCNC: 33 U/L (ref 0–70)
ANION GAP SERPL CALCULATED.3IONS-SCNC: 2 MMOL/L (ref 3–14)
AST SERPL W P-5'-P-CCNC: 18 U/L (ref 0–45)
BILIRUB SERPL-MCNC: 0.8 MG/DL (ref 0.2–1.3)
BUN SERPL-MCNC: 14 MG/DL (ref 7–30)
CALCIUM SERPL-MCNC: 9.4 MG/DL (ref 8.5–10.1)
CHLORIDE BLD-SCNC: 104 MMOL/L (ref 94–109)
CHOLEST SERPL-MCNC: 235 MG/DL
CO2 SERPL-SCNC: 31 MMOL/L (ref 20–32)
CREAT SERPL-MCNC: 1.05 MG/DL (ref 0.66–1.25)
ERYTHROCYTE [DISTWIDTH] IN BLOOD BY AUTOMATED COUNT: 13 % (ref 10–15)
FASTING STATUS PATIENT QL REPORTED: YES
GFR SERPL CREATININE-BSD FRML MDRD: 90 ML/MIN/1.73M2
GLUCOSE BLD-MCNC: 96 MG/DL (ref 70–99)
HCT VFR BLD AUTO: 44.2 % (ref 40–53)
HDLC SERPL-MCNC: 50 MG/DL
HGB BLD-MCNC: 15 G/DL (ref 13.3–17.7)
LDLC SERPL CALC-MCNC: 157 MG/DL
MCH RBC QN AUTO: 29.4 PG (ref 26.5–33)
MCHC RBC AUTO-ENTMCNC: 33.9 G/DL (ref 31.5–36.5)
MCV RBC AUTO: 87 FL (ref 78–100)
NONHDLC SERPL-MCNC: 185 MG/DL
PLATELET # BLD AUTO: 216 10E3/UL (ref 150–450)
POTASSIUM BLD-SCNC: 4.4 MMOL/L (ref 3.4–5.3)
PROT SERPL-MCNC: 8.2 G/DL (ref 6.8–8.8)
RBC # BLD AUTO: 5.1 10E6/UL (ref 4.4–5.9)
SODIUM SERPL-SCNC: 137 MMOL/L (ref 133–144)
TRIGL SERPL-MCNC: 138 MG/DL
WBC # BLD AUTO: 6.6 10E3/UL (ref 4–11)

## 2022-03-23 PROCEDURE — 80061 LIPID PANEL: CPT | Performed by: INTERNAL MEDICINE

## 2022-03-23 PROCEDURE — 80053 COMPREHEN METABOLIC PANEL: CPT | Performed by: INTERNAL MEDICINE

## 2022-03-23 NOTE — NURSING NOTE
Prior to immunization administration, verified patients identity using patient s name and date of birth. Please see Immunization Activity for additional information.     Screening Questionnaire for Adult Immunization    Are you sick today?   No   Do you have allergies to medications, food, a vaccine component or latex?   No   Have you ever had a serious reaction after receiving a vaccination?   No   Do you have a long-term health problem with heart, lung, kidney, or metabolic disease (e.g., diabetes), asthma, a blood disorder, no spleen, complement component deficiency, a cochlear implant, or a spinal fluid leak?  Are you on long-term aspirin therapy?   No   Do you have cancer, leukemia, HIV/AIDS, or any other immune system problem?   No   Do you have a parent, brother, or sister with an immune system problem?   No   In the past 3 months, have you taken medications that affect  your immune system, such as prednisone, other steroids, or anticancer drugs; drugs for the treatment of rheumatoid arthritis, Crohn s disease, or psoriasis; or have you had radiation treatments?   No   Have you had a seizure, or a brain or other nervous system problem?   No   During the past year, have you received a transfusion of blood or blood    products, or been given immune (gamma) globulin or antiviral drug?   No   For women: Are you pregnant or is there a chance you could become       pregnant during the next month?   No   Have you received any vaccinations in the past 4 weeks?   No     Immunization questionnaire answers were all negative.        Per orders of Dr. Santiago, injection of Tdap given by Estephania Rogers MA. Patient instructed to remain in clinic for 15 minutes afterwards, and to report any adverse reaction to me immediately.       Screening performed by Estephania Rogers MA on 3/23/2022 at 1:50 PM.

## 2022-03-23 NOTE — NURSING NOTE
"41 year old  Chief Complaint   Patient presents with     Physical       Blood pressure 119/74, pulse 53, temperature 97.8  F (36.6  C), height 1.753 m (5' 9.02\"), weight 80.3 kg (177 lb 1.3 oz), SpO2 98 %. Body mass index is 26.14 kg/m .  Patient Active Problem List   Diagnosis     Lichen planus       Wt Readings from Last 2 Encounters:   03/23/22 80.3 kg (177 lb 1.3 oz)   03/26/21 72.6 kg (160 lb)     BP Readings from Last 3 Encounters:   03/23/22 119/74   03/26/21 115/76   11/17/20 125/82         Current Outpatient Medications   Medication     Biotin 10 MG CAPS     fish oil-omega-3 fatty acids 1000 MG capsule     triamcinolone (KENALOG) 0.5 % external ointment     vitamin D3 (CHOLECALCIFEROL) 50 mcg (2000 units) tablet     Current Facility-Administered Medications   Medication     lidocaine (PF) (XYLOCAINE) 2 % injection 20 mg       Social History     Tobacco Use     Smoking status: Never Smoker     Smokeless tobacco: Never Used   Substance Use Topics     Alcohol use: Yes     Alcohol/week: 1.0 standard drink     Types: 1 Standard drinks or equivalent per week     Comment: 1-2  per week     Drug use: No       Health Maintenance Due   Topic Date Due     ADVANCE CARE PLANNING  Never done     INFLUENZA VACCINE (1) 09/01/2021     DTAP/TDAP/TD IMMUNIZATION (2 - Td or Tdap) 03/16/2022       No results found for: PAP      March 23, 2022 12:54 PM  "

## 2022-09-04 ENCOUNTER — HEALTH MAINTENANCE LETTER (OUTPATIENT)
Age: 42
End: 2022-09-04

## 2023-04-30 ENCOUNTER — HEALTH MAINTENANCE LETTER (OUTPATIENT)
Age: 43
End: 2023-04-30

## 2023-05-30 ASSESSMENT — ENCOUNTER SYMPTOMS
HEMATURIA: 0
CHILLS: 0
FEVER: 0
FREQUENCY: 0
NAUSEA: 0
SORE THROAT: 0
DYSURIA: 0
ABDOMINAL PAIN: 0
JOINT SWELLING: 0
EYE PAIN: 0
HEADACHES: 0
WEAKNESS: 0
DIARRHEA: 0
HEARTBURN: 0
SHORTNESS OF BREATH: 0
NERVOUS/ANXIOUS: 0
ARTHRALGIAS: 0
COUGH: 0
DIZZINESS: 0
CONSTIPATION: 0
HEMATOCHEZIA: 0
MYALGIAS: 0
PARESTHESIAS: 0
PALPITATIONS: 0

## 2023-06-06 ENCOUNTER — OFFICE VISIT (OUTPATIENT)
Dept: FAMILY MEDICINE | Facility: CLINIC | Age: 43
End: 2023-06-06
Payer: COMMERCIAL

## 2023-06-06 VITALS
DIASTOLIC BLOOD PRESSURE: 82 MMHG | SYSTOLIC BLOOD PRESSURE: 128 MMHG | BODY MASS INDEX: 26.07 KG/M2 | WEIGHT: 176 LBS | HEART RATE: 61 BPM | RESPIRATION RATE: 15 BRPM | TEMPERATURE: 97.9 F | HEIGHT: 69 IN | OXYGEN SATURATION: 99 %

## 2023-06-06 DIAGNOSIS — Z13.220 SCREENING FOR LIPID DISORDERS: ICD-10-CM

## 2023-06-06 DIAGNOSIS — R73.09 ELEVATED GLUCOSE: ICD-10-CM

## 2023-06-06 DIAGNOSIS — Z12.11 SCREEN FOR COLON CANCER: ICD-10-CM

## 2023-06-06 DIAGNOSIS — Z00.00 ROUTINE GENERAL MEDICAL EXAMINATION AT A HEALTH CARE FACILITY: Primary | ICD-10-CM

## 2023-06-06 LAB
ALBUMIN SERPL BCG-MCNC: 4.8 G/DL (ref 3.5–5.2)
ALP SERPL-CCNC: 67 U/L (ref 40–129)
ALT SERPL W P-5'-P-CCNC: 23 U/L (ref 10–50)
ANION GAP SERPL CALCULATED.3IONS-SCNC: 11 MMOL/L (ref 7–15)
AST SERPL W P-5'-P-CCNC: 30 U/L (ref 10–50)
BILIRUB SERPL-MCNC: 0.8 MG/DL
BUN SERPL-MCNC: 22 MG/DL (ref 6–20)
CALCIUM SERPL-MCNC: 9.9 MG/DL (ref 8.6–10)
CHLORIDE SERPL-SCNC: 102 MMOL/L (ref 98–107)
CHOLEST SERPL-MCNC: 190 MG/DL
CREAT SERPL-MCNC: 1.17 MG/DL (ref 0.67–1.17)
DEPRECATED HCO3 PLAS-SCNC: 26 MMOL/L (ref 22–29)
ERYTHROCYTE [DISTWIDTH] IN BLOOD BY AUTOMATED COUNT: 13 % (ref 10–15)
GFR SERPL CREATININE-BSD FRML MDRD: 79 ML/MIN/1.73M2
GLUCOSE SERPL-MCNC: 102 MG/DL (ref 70–99)
HCT VFR BLD AUTO: 44.6 % (ref 40–53)
HDLC SERPL-MCNC: 48 MG/DL
HGB BLD-MCNC: 15.3 G/DL (ref 13.3–17.7)
LDLC SERPL CALC-MCNC: 122 MG/DL
MCH RBC QN AUTO: 30 PG (ref 26.5–33)
MCHC RBC AUTO-ENTMCNC: 34.3 G/DL (ref 31.5–36.5)
MCV RBC AUTO: 88 FL (ref 78–100)
NONHDLC SERPL-MCNC: 142 MG/DL
PLATELET # BLD AUTO: 201 10E3/UL (ref 150–450)
POTASSIUM SERPL-SCNC: 4.3 MMOL/L (ref 3.4–5.3)
PROT SERPL-MCNC: 7.7 G/DL (ref 6.4–8.3)
RBC # BLD AUTO: 5.1 10E6/UL (ref 4.4–5.9)
SODIUM SERPL-SCNC: 139 MMOL/L (ref 136–145)
TRIGL SERPL-MCNC: 102 MG/DL
WBC # BLD AUTO: 6.6 10E3/UL (ref 4–11)

## 2023-06-06 PROCEDURE — 80051 ELECTROLYTE PANEL: CPT | Mod: ORL | Performed by: INTERNAL MEDICINE

## 2023-06-06 PROCEDURE — 80061 LIPID PANEL: CPT | Mod: ORL | Performed by: INTERNAL MEDICINE

## 2023-06-06 NOTE — PROGRESS NOTES
Israel Urrutia is a 42 year old male with hx of lichen planus on the palms of his hands. He is here for a general check up.  He is fasting. He is up to date on eye exams and dental visits.    HCM  Advanced directive: none on file, information given  COVID Series eligible for bivalent booster  Vaccines up to date  Colonoscopy baseline is now due      Diet: High in protein, some dairy, little carbs. Eats fish, chicken, and eggs.  Wt Readings from Last 4 Encounters:   06/06/23 79.8 kg (176 lb)   03/23/22 80.3 kg (177 lb 1.3 oz)   03/26/21 72.6 kg (160 lb)   11/17/20 70.5 kg (155 lb 8 oz)     Body mass index is 25.98 kg/m .    Weight  Manolo previously lost weight to 155 lbs. He felt that this was too low and made a goal of 165 lbs after he had gained back some weight to 177 lbs. Over the past year, he has started working out and building muscle mass. He reports that it took most of this year to fully internalize that he was healthy without getting to a weight of 165 lbs, as muscle weighs more than fat. He is currently working out along with his spouse who was training for a marathon, 60-90 minutes in the gym per day. He is also eating a lot more protein, including fish, chicken, and eggs.  He eats some dairy and little carbs.     Upcoming Travel  Manolo has upcoming travel plans to [place] with his family in the next month. They have gotten the malaria medication and are aware the yellow fever and Hepatitis A/B vaccines are recommended.        PMH, PSH, FH, medications, allergies and immunizations are updated this visit.      Social    2 sons, 10 and 8 years old (turning 8 on 6/10)  Works in Sales     HABITS:  Tob: none  ETOH: none  Calcium: 2/day + vitamin D 2000 IU daily   Caffeine: 1 cup of coffee/day  Exercise: 60-90 minutes in gym/day, strength training together with spouse     MALE ROS  Contraception: vasectomy in 2021, has not done follow-up         Current Outpatient Medications   Medication Sig Dispense  "Refill     Biotin 10 MG CAPS        vitamin D3 (CHOLECALCIFEROL) 50 mcg (2000 units) tablet Take 1 tablet by mouth daily       fish oil-omega-3 fatty acids 1000 MG capsule Take 2 g by mouth daily (Patient not taking: Reported on 6/6/2023)       triamcinolone (KENALOG) 0.5 % external ointment Apply once or twice daily to affected area (Patient not taking: Reported on 6/6/2023) 15 g 1     Allergies   Allergen Reactions     Shellfish Allergy          ROS  CONSTITUTIONAL:NEGATIVE for fever, chills, change in weight  INTEGUMENTARY/SKIN: NEGATIVE for worrisome rashes, moles or lesions  EYES: NEGATIVE for vision changes or irritation  ENT/MOUTH: NEGATIVE for ear, mouth and throat problems  RESP:NEGATIVE for significant cough or SOB  CV: NEGATIVE for chest pain, palpitations, VILLEGAS, orthopnea, PND  or peripheral edema  GI: NEGATIVE for nausea, abdominal pain, heartburn, or change in bowel habits  :NEGATIVE for frequency, dysuria, or hematuria  MUSCULOSKELETAL:NEGATIVE for significant arthralgias or myalgia  NEURO: NEGATIVE for weakness, dizziness or paresthesias  ENDOCRINE: NEGATIVE for polyuria/dipsia,  temperature intolerance, skin/hair changes  HEME/ALLERGY/IMMUNE: NEGATIVE for bleeding problems  PSYCHIATRIC: NEGATIVE for changes in mood or affect    EXAM  /89 (BP Location: Right arm, Patient Position: Sitting, Cuff Size: Adult Regular)   Pulse 61   Temp 97.9  F (36.6  C) (Skin)   Resp 15   Ht 1.753 m (5' 9.02\")   Wt 79.8 kg (176 lb)   SpO2 99%   BMI 25.98 kg/m   BP recheck 128/82 further into the visit.     GENERAL APPEARANCE: Alert, pleasant, NAD  EYES: PERRL, EOMI, conjunctiva clear  HENT: TM normal bilaterally. Nose and mouth without lesions  NECK: no adenopathy, thyroid normal to palpation  RESP: lungs clear to auscultation bilaterally  Axillae: no palpable axillary masses or adenopathy  CV: regular rate and rhythm, normal S1 S2, no murmur, no carotid bruits  ABDOMEN: soft, nontender, without HSM or " masses. Bowel sounds normal  MS: extremities normal- no gross deformities noted, no tender, hot or swollen joints.   SKIN: no suspicious lesions or rashes  NEURO: Normal strength and tone, sensory exam grossly normal, DTR normoreflexive in upper and lower extremities  PSYCH: mentation appears normal. and affect normal/bright.  EXT: no peripheral edema, pedal pulses palpable      Assessment:  (Z00.00) Routine general medical examination at a health care facility  (primary encounter diagnosis)  Comment: Healthy 42 year old man.   Plan: CBC with platelets, Comprehensive metabolic         panel        Today we discussed ways to maintain a healthy lifestyle with sensible eating, regular exercise and self cares. We dicussed calcium and Vitamin D intake, vaccinations and preventive health screens.      (Z13.220) Screening for lipid disorders  Comment: Fasting.   Plan: Lipid Profile          (Z12.11) Screen for colon cancer  Comment: Routine screening, baseline.   Plan: Colonoscopy Screening  Referral            I have reviewed, edited and approved the above scribed note.    Shabnam Santiago MD  Internal Medicine/Pediatrics      I, Radha Valadez, am serving as a scribe to document services personally performed by Dr. Shabnam Santiago, based on data collection and the provider's statements to me.       Answers for HPI/ROS submitted by the patient on 5/30/2023  Frequency of exercise:: 4-5 days/week  Getting at least 3 servings of Calcium per day:: Yes  Diet:: Carbohydrate counting, Other  Taking medications regularly:: Yes  Medication side effects:: None  Bi-annual eye exam:: Yes  Dental care twice a year:: Yes  Sleep apnea or symptoms of sleep apnea:: None  abdominal pain: No  Blood in stool: No  Blood in urine: No  chest pain: No  chills: No  congestion: No  constipation: No  cough: No  diarrhea: No  dizziness: No  ear pain: No  eye pain: No  nervous/anxious: No  fever: No  frequency: No  genital sores: No  headaches:  No  hearing loss: No  heartburn: No  arthralgias: No  joint swelling: No  peripheral edema: No  mood changes: No  myalgias: No  nausea: No  dysuria: No  palpitations: No  Skin sensation changes: No  sore throat: No  urgency: No  rash: No  shortness of breath: No  visual disturbance: No  weakness: No  impotence: No  penile discharge: No  Additional concerns today:: No  Duration of exercise:: 45-60 minutes

## 2023-06-06 NOTE — PROGRESS NOTES
Israel Urrutia is a 42 year old male with hx of lichen planus on the palms of his hands.  He is here for a general check up.  He is *** fasting. He is up to date on eye exams and dental visits.    HCM  Advanced directive: none on file***  COVID Series: Due for bivalent booster  Other Vaccines: Up to date  Colonoscopy: Will be due for baseline colon cancer screening in 1 week  PSA ***    Diet: ***  Wt Readings from Last 4 Encounters:   03/23/22 80.3 kg (177 lb 1.3 oz)   03/26/21 72.6 kg (160 lb)   11/17/20 70.5 kg (155 lb 8 oz)   05/03/19 86.9 kg (191 lb 8 oz)     There is no height or weight on file to calculate BMI.    ***    PMH, PSH, FH, medications, allergies and immunizations are updated this visit.      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     HABITS:  Tob: none  ETOH: none  Calcium: 2/day + vitamin D 2000 IU daily ***  Caffeine: 1 cup of coffee/day  Exercise: some golf, swim, skiing     MALE ROS  Partner: monogamous with spouse  ED: none  Contraception: vasectomy in 2021, has not done follow-up  STD concerns: no  BPH: no symptoms      Current Outpatient Medications   Medication Sig Dispense Refill     Biotin 10 MG CAPS        fish oil-omega-3 fatty acids 1000 MG capsule Take 2 g by mouth daily       triamcinolone (KENALOG) 0.5 % external ointment Apply once or twice daily to affected area 15 g 1     vitamin D3 (CHOLECALCIFEROL) 50 mcg (2000 units) tablet Take 1 tablet by mouth daily       Allergies   Allergen Reactions     Shellfish Allergy          ROS  CONSTITUTIONAL:NEGATIVE for fever, chills, change in weight  INTEGUMENTARY/SKIN: NEGATIVE for worrisome rashes, moles or lesions  EYES: NEGATIVE for vision changes or irritation  ENT/MOUTH: NEGATIVE for ear, mouth and throat problems  RESP:NEGATIVE for significant cough or SOB  CV: NEGATIVE for chest pain, palpitations, VILLEGAS, orthopnea, PND  or peripheral edema  GI: NEGATIVE for  "nausea, abdominal pain, heartburn, or change in bowel habits  :NEGATIVE for frequency, dysuria, or hematuria  MUSCULOSKELETAL:NEGATIVE for significant arthralgias or myalgia  NEURO: NEGATIVE for weakness, dizziness or paresthesias  ENDOCRINE: NEGATIVE for polyuria/dipsia,  temperature intolerance, skin/hair changes  HEME/ALLERGY/IMMUNE: NEGATIVE for bleeding problems  PSYCHIATRIC: NEGATIVE for changes in mood or affect    EXAM  ***    GENERAL APPEARANCE: {JTPPTDESCRIPTION:[id number]::\"Alert, pleasant, NAD\"}  EYES: PERRL, EOMI, conjunctiva clear  HENT: TM normal bilaterally. Nose and mouth without lesions  NECK: no adenopathy, thyroid normal to palpation  RESP: lungs clear to auscultation bilaterally, ***  Axillae: no palpable axillary masses or adenopathy  CV: regular rate and rhythm, normal S1 S2, *** murmur, no carotid bruits  ABDOMEN: soft, nontender, without HSM or masses. Bowel sounds normal  MS: extremities normal- no gross deformities noted, no tender, hot or swollen joints.  ***  SKIN: no suspicious lesions or rashes  NEURO: Normal strength and tone, sensory exam grossly normal, DTR normoreflexive in upper and lower extremities  PSYCH: mentation appears normal. and affect normal/bright.  EXT: no peripheral edema, pedal pulses palpable    Assessment:  ***      ***  Answers for HPI/ROS submitted by the patient on 5/30/2023  Frequency of exercise:: 4-5 days/week  Getting at least 3 servings of Calcium per day:: Yes  Diet:: Carbohydrate counting, Other  Taking medications regularly:: Yes  Medication side effects:: None  Bi-annual eye exam:: Yes  Dental care twice a year:: Yes  Sleep apnea or symptoms of sleep apnea:: None  abdominal pain: No  Blood in stool: No  Blood in urine: No  chest pain: No  chills: No  congestion: No  constipation: No  cough: No  diarrhea: No  dizziness: No  ear pain: No  eye pain: No  nervous/anxious: No  fever: No  frequency: No  genital sores: No  headaches: No  hearing loss: " No  heartburn: No  arthralgias: No  joint swelling: No  peripheral edema: No  mood changes: No  myalgias: No  nausea: No  dysuria: No  palpitations: No  Skin sensation changes: No  sore throat: No  urgency: No  rash: No  shortness of breath: No  visual disturbance: No  weakness: No  impotence: No  penile discharge: No  Additional concerns today:: No  Duration of exercise:: 45-60 minutes

## 2023-06-06 NOTE — NURSING NOTE
"42 year old  Chief Complaint   Patient presents with     Physical       Blood pressure 134/89, pulse 61, temperature 97.9  F (36.6  C), temperature source Skin, resp. rate 15, height 1.753 m (5' 9.02\"), weight 79.8 kg (176 lb), SpO2 99 %. Body mass index is 25.98 kg/m .  Patient Active Problem List   Diagnosis     Lichen planus       Wt Readings from Last 2 Encounters:   06/06/23 79.8 kg (176 lb)   03/23/22 80.3 kg (177 lb 1.3 oz)     BP Readings from Last 3 Encounters:   06/06/23 134/89   03/23/22 119/74   03/26/21 115/76         Current Outpatient Medications   Medication     Biotin 10 MG CAPS     vitamin D3 (CHOLECALCIFEROL) 50 mcg (2000 units) tablet     fish oil-omega-3 fatty acids 1000 MG capsule     triamcinolone (KENALOG) 0.5 % external ointment     Current Facility-Administered Medications   Medication     lidocaine (PF) (XYLOCAINE) 2 % injection 20 mg       Social History     Tobacco Use     Smoking status: Never     Smokeless tobacco: Never   Vaping Use     Vaping status: Never Used   Substance Use Topics     Alcohol use: Yes     Alcohol/week: 1.0 standard drink of alcohol     Types: 1 Standard drinks or equivalent per week     Comment: 1-2  per week     Drug use: No       Health Maintenance Due   Topic Date Due     ADVANCE CARE PLANNING  Never done     HEPATITIS B IMMUNIZATION (1 of 3 - 3-dose series) Never done     COVID-19 Vaccine (4 - Moderna series) 01/08/2022       No results found for: PAP      June 6, 2023 8:39 AM    "

## 2023-06-07 PROBLEM — R73.03 PREDIABETES: Status: ACTIVE | Noted: 2023-06-07

## 2023-06-07 LAB — HBA1C MFR BLD: 5.8 % (ref 0–5.6)

## 2023-06-09 ENCOUNTER — MYC MEDICAL ADVICE (OUTPATIENT)
Dept: FAMILY MEDICINE | Facility: CLINIC | Age: 43
End: 2023-06-09

## 2024-07-07 ENCOUNTER — HEALTH MAINTENANCE LETTER (OUTPATIENT)
Age: 44
End: 2024-07-07

## 2024-10-04 SDOH — HEALTH STABILITY: PHYSICAL HEALTH: ON AVERAGE, HOW MANY MINUTES DO YOU ENGAGE IN EXERCISE AT THIS LEVEL?: 60 MIN

## 2024-10-04 SDOH — HEALTH STABILITY: PHYSICAL HEALTH: ON AVERAGE, HOW MANY DAYS PER WEEK DO YOU ENGAGE IN MODERATE TO STRENUOUS EXERCISE (LIKE A BRISK WALK)?: 5 DAYS

## 2024-10-04 ASSESSMENT — SOCIAL DETERMINANTS OF HEALTH (SDOH): HOW OFTEN DO YOU GET TOGETHER WITH FRIENDS OR RELATIVES?: ONCE A WEEK

## 2024-10-07 NOTE — PROGRESS NOTES
Israel Urrutia is a 44 year old male with hx of lichen planus on the palms of his hands  He is here for a general check up.  He is fasting. He is up to date on eye exams and dental visits.     [facility]  Advanced directive: None on file--info given  COVID/Influenza vaccine will return for nurse visit  Other Vaccines: Up to date  Colon cancer screening: Due now  PSA age 50     Diet: omnivore, no red meat  Weight 174 lb at home  Wt Readings from Last 4 Encounters:   10/09/24 79.8 kg (176 lb)   06/06/23 79.8 kg (176 lb)   03/23/22 80.3 kg (177 lb 1.3 oz)   03/26/21 72.6 kg (160 lb)     Body mass index is 25.77 kg/m .     URI  10 day hx of URI symptoms  Son was ill with similar sx  Dry cough, now with congestion, no ST, no fever or body aches  Not short of breath or wheezing    Prediabetes  Diet controlled  Changed diet and exercises regularly  Lab Results   Component Value Date    A1C 5.8 06/07/2023    A1C 5.1 05/03/2019         PMH, PSH, FH, medications, allergies and immunizations are updated this visit.        Social    2 sons, 12 and 10 years old   Works in Sales (stress)     HABITS:  Tob: none  ETOH: rare  Calcium: 2/day (lactose intolerant) + vitamin D 2000 IU daily   Caffeine: 0-1 cup of coffee/day  Exercise: running, weights 6 days of the week, yoga     MALE ROS  Contraception: vasectomy in 2021, has not done follow-up       Current Outpatient Medications   Medication Sig Dispense Refill    Biotin 10 MG CAPS       fish oil-omega-3 fatty acids 1000 MG capsule Take 2 g by mouth daily (Patient not taking: Reported on 6/6/2023)      triamcinolone (KENALOG) 0.5 % external ointment Apply once or twice daily to affected area (Patient not taking: Reported on 6/6/2023) 15 g 1    vitamin D3 (CHOLECALCIFEROL) 50 mcg (2000 units) tablet Take 1 tablet by mouth daily         Allergies   Allergen Reactions    Shellfish Allergy            ROS  CONSTITUTIONAL:NEGATIVE for fever, chills, change in weight  INTEGUMENTARY/SKIN:  "NEGATIVE for worrisome rashes, moles or lesions  EYES: NEGATIVE for vision changes or irritation  ENT/MOUTH: NEGATIVE for ear, mouth and throat problems, some seasonal allergies  RESP:+ URI symptoms as above  CV: NEGATIVE for chest pain, palpitations, VILLEGAS, orthopnea, PND  or peripheral edema  GI: NEGATIVE for nausea, abdominal pain, heartburn, or change in bowel habits  :NEGATIVE for frequency, dysuria, or hematuria  MUSCULOSKELETAL:NEGATIVE for significant arthralgias or myalgia  NEURO: NEGATIVE for weakness, dizziness or paresthesias  ENDOCRINE: NEGATIVE for polyuria/dipsia,  temperature intolerance, skin/hair changes + +prediabetes  HEME/ALLERGY/IMMUNE: NEGATIVE for bleeding problems  PSYCHIATRIC: NEGATIVE for changes in mood or affect     EXAM  /88 (BP Location: Left arm, Patient Position: Sitting, Cuff Size: Adult Regular)   Pulse 58   Temp 97.3  F (36.3  C) (Skin)   Resp 17   Ht 1.76 m (5' 9.29\")   Wt 79.8 kg (176 lb)   SpO2 99%   BMI 25.77 kg/m         GENERAL APPEARANCE: Alert, pleasant, NAD  EYES: PERRL, EOMI, conjunctiva clear  HENT: TM normal bilaterally. Nose and mouth without lesions  NECK: no adenopathy, thyroid normal to palpation  RESP: lungs clear to auscultation bilaterally,   Axillae: no palpable axillary masses or adenopathy  CV: regular rate and rhythm, normal S1 S2,   no murmur, no carotid bruits  ABDOMEN: soft, nontender, without HSM or masses. Bowel sounds normal  MS: extremities normal- no gross deformities noted, no tender, hot or swollen joints.    SKIN: no suspicious lesions or rashes  NEURO: Normal strength and tone, sensory exam grossly normal, DTR normoreflexive in upper and lower extremities  PSYCH: mentation appears normal. and affect normal/bright.  EXT: no peripheral edema, pedal pulses palpable     Assessment:  (Z00.00) Routine general medical examination at a health care facility  (primary encounter diagnosis)  Comment: 44 year old male in good health  Plan: Today " we discussed ways to maintain a healthy lifestyle with sensible eating, regular exercise and self cares. We dicussed calcium and Vitamin D intake, vaccinations and preventive health screens.  Will return for flu and COVID vaccines.     (Z13.220) Lipid screening  Comment: fasting  Plan: Lipid Profile          (Z12.11) Screen for colon cancer  Comment: due for baseline screening  Plan: Colonoscopy Screening  Referral        Referral given    (R73.03) Prediabetes  Comment: A1c in non diabetic range  Lab Results   Component Value Date    A1C 5.2 10/09/2024    A1C 5.8 06/07/2023    A1C 5.1 05/03/2019   Plan: Hemoglobin A1c, Comprehensive metabolic panel        Continue lifestyle changes    Shabnam Santiago MD  Internal Medicine/Pediatrics

## 2024-10-07 NOTE — PATIENT INSTRUCTIONS
Patient Education   Preventive Care Advice   This is general advice given by our system to help you stay healthy. However, your care team may have specific advice just for you. Please talk to your care team about your preventive care needs.  Nutrition  Eat 5 or more servings of fruits and vegetables each day.  Try wheat bread, brown rice and whole grain pasta (instead of white bread, rice, and pasta).  Get enough calcium and vitamin D. Check the label on foods and aim for 100% of the RDA (recommended daily allowance).  Lifestyle  Exercise at least 150 minutes each week  (30 minutes a day, 5 days a week).  Do muscle strengthening activities 2 days a week. These help control your weight and prevent disease.  No smoking.  Wear sunscreen to prevent skin cancer.  Have a dental exam and cleaning every 6 months.  Yearly exams  See your health care team every year to talk about:  Any changes in your health.  Any medicines your care team has prescribed.  Preventive care, family planning, and ways to prevent chronic diseases.  Shots (vaccines)   HPV shots (up to age 26), if you've never had them before.  Hepatitis B shots (up to age 59), if you've never had them before.  COVID-19 shot: Get this shot when it's due.  Flu shot: Get a flu shot every year.  Tetanus shot: Get a tetanus shot every 10 years.  Pneumococcal, hepatitis A, and RSV shots: Ask your care team if you need these based on your risk.  Shingles shot (for age 50 and up)  General health tests  Diabetes screening:  Starting at age 35, Get screened for diabetes at least every 3 years.  If you are younger than age 35, ask your care team if you should be screened for diabetes.  Cholesterol test: At age 39, start having a cholesterol test every 5 years, or more often if advised.  Bone density scan (DEXA): At age 50, ask your care team if you should have this scan for osteoporosis (brittle bones).  Hepatitis C: Get tested at least once in your life.  STIs (sexually  transmitted infections)  Before age 24: Ask your care team if you should be screened for STIs.  After age 24: Get screened for STIs if you're at risk. You are at risk for STIs (including HIV) if:  You are sexually active with more than one person.  You don't use condoms every time.  You or a partner was diagnosed with a sexually transmitted infection.  If you are at risk for HIV, ask about PrEP medicine to prevent HIV.  Get tested for HIV at least once in your life, whether you are at risk for HIV or not.  Cancer screening tests  Cervical cancer screening: If you have a cervix, begin getting regular cervical cancer screening tests starting at age 21.  Breast cancer scan (mammogram): If you've ever had breasts, begin having regular mammograms starting at age 40. This is a scan to check for breast cancer.  Colon cancer screening: It is important to start screening for colon cancer at age 45.  Have a colonoscopy test every 10 years (or more often if you're at risk) Or, ask your provider about stool tests like a FIT test every year or Cologuard test every 3 years.  To learn more about your testing options, visit:   .  For help making a decision, visit:   https://bit.ly/ww43899.  Prostate cancer screening test: If you have a prostate, ask your care team if a prostate cancer screening test (PSA) at age 55 is right for you.  Lung cancer screening: If you are a current or former smoker ages 50 to 80, ask your care team if ongoing lung cancer screenings are right for you.  For informational purposes only. Not to replace the advice of your health care provider. Copyright   2023 [employer]. All rights reserved. Clinically reviewed by the [facility]. [employer] 178992 - REV 01/24.

## 2024-10-09 ENCOUNTER — OFFICE VISIT (OUTPATIENT)
Dept: FAMILY MEDICINE | Facility: CLINIC | Age: 44
End: 2024-10-09
Payer: COMMERCIAL

## 2024-10-09 VITALS
RESPIRATION RATE: 17 BRPM | SYSTOLIC BLOOD PRESSURE: 118 MMHG | HEART RATE: 58 BPM | OXYGEN SATURATION: 99 % | WEIGHT: 176 LBS | DIASTOLIC BLOOD PRESSURE: 72 MMHG | BODY MASS INDEX: 26.07 KG/M2 | HEIGHT: 69 IN | TEMPERATURE: 97.3 F

## 2024-10-09 DIAGNOSIS — Z12.11 SCREEN FOR COLON CANCER: ICD-10-CM

## 2024-10-09 DIAGNOSIS — R73.03 PREDIABETES: ICD-10-CM

## 2024-10-09 DIAGNOSIS — Z00.00 ROUTINE GENERAL MEDICAL EXAMINATION AT A HEALTH CARE FACILITY: Primary | ICD-10-CM

## 2024-10-09 DIAGNOSIS — Z13.220 LIPID SCREENING: ICD-10-CM

## 2024-10-09 LAB
ALBUMIN SERPL BCG-MCNC: 4.4 G/DL (ref 3.5–5.2)
ALP SERPL-CCNC: 60 U/L (ref 40–150)
ALT SERPL W P-5'-P-CCNC: 21 U/L (ref 0–70)
ANION GAP SERPL CALCULATED.3IONS-SCNC: 10 MMOL/L (ref 7–15)
AST SERPL W P-5'-P-CCNC: 20 U/L (ref 0–45)
BILIRUB SERPL-MCNC: 0.4 MG/DL
BUN SERPL-MCNC: 13.5 MG/DL (ref 6–20)
CALCIUM SERPL-MCNC: 9.2 MG/DL (ref 8.8–10.4)
CHLORIDE SERPL-SCNC: 103 MMOL/L (ref 98–107)
CHOLEST SERPL-MCNC: 197 MG/DL
CREAT SERPL-MCNC: 0.99 MG/DL (ref 0.67–1.17)
EGFRCR SERPLBLD CKD-EPI 2021: >90 ML/MIN/1.73M2
EST. AVERAGE GLUCOSE BLD GHB EST-MCNC: 103 MG/DL
FASTING STATUS PATIENT QL REPORTED: YES
FASTING STATUS PATIENT QL REPORTED: YES
GLUCOSE SERPL-MCNC: 94 MG/DL (ref 70–99)
HBA1C MFR BLD: 5.2 % (ref 0–5.6)
HCO3 SERPL-SCNC: 28 MMOL/L (ref 22–29)
HDLC SERPL-MCNC: 40 MG/DL
LDLC SERPL CALC-MCNC: 132 MG/DL
NONHDLC SERPL-MCNC: 157 MG/DL
POTASSIUM SERPL-SCNC: 4.1 MMOL/L (ref 3.4–5.3)
PROT SERPL-MCNC: 7.2 G/DL (ref 6.4–8.3)
SODIUM SERPL-SCNC: 141 MMOL/L (ref 135–145)
TRIGL SERPL-MCNC: 126 MG/DL

## 2024-10-09 ASSESSMENT — PAIN SCALES - GENERAL: PAINLEVEL: NO PAIN (0)

## (undated) RX ORDER — LIDOCAINE HYDROCHLORIDE 20 MG/ML
INJECTION, SOLUTION INFILTRATION; PERINEURAL
Status: DISPENSED
Start: 2021-03-26